# Patient Record
Sex: FEMALE | Race: WHITE | Employment: FULL TIME | URBAN - METROPOLITAN AREA
[De-identification: names, ages, dates, MRNs, and addresses within clinical notes are randomized per-mention and may not be internally consistent; named-entity substitution may affect disease eponyms.]

---

## 2023-10-26 ENCOUNTER — OFFICE VISIT (OUTPATIENT)
Dept: GASTROENTEROLOGY | Facility: CLINIC | Age: 38
End: 2023-10-26

## 2023-10-26 VITALS
SYSTOLIC BLOOD PRESSURE: 110 MMHG | WEIGHT: 172 LBS | BODY MASS INDEX: 29.01 KG/M2 | HEART RATE: 85 BPM | HEIGHT: 64.5 IN | DIASTOLIC BLOOD PRESSURE: 73 MMHG

## 2023-10-26 DIAGNOSIS — R10.11 RUQ PAIN: Primary | ICD-10-CM

## 2023-10-26 DIAGNOSIS — Z78.9 ALCOHOL USE: ICD-10-CM

## 2023-10-26 DIAGNOSIS — K59.09 CHRONIC CONSTIPATION: ICD-10-CM

## 2023-10-26 PROBLEM — F41.8 DEPRESSION WITH ANXIETY: Status: ACTIVE | Noted: 2023-10-26

## 2023-10-26 PROCEDURE — 3074F SYST BP LT 130 MM HG: CPT | Performed by: INTERNAL MEDICINE

## 2023-10-26 PROCEDURE — 3008F BODY MASS INDEX DOCD: CPT | Performed by: INTERNAL MEDICINE

## 2023-10-26 PROCEDURE — 3078F DIAST BP <80 MM HG: CPT | Performed by: INTERNAL MEDICINE

## 2023-10-26 PROCEDURE — 99244 OFF/OP CNSLTJ NEW/EST MOD 40: CPT | Performed by: INTERNAL MEDICINE

## 2023-10-26 RX ORDER — LORATADINE 10 MG/1
CAPSULE, LIQUID FILLED ORAL AS DIRECTED
COMMUNITY

## 2023-10-26 RX ORDER — ESCITALOPRAM OXALATE 20 MG/1
TABLET ORAL
COMMUNITY
Start: 2023-10-22

## 2023-10-26 RX ORDER — NALTREXONE HYDROCHLORIDE 50 MG/1
50 TABLET, FILM COATED ORAL DAILY
COMMUNITY

## 2023-10-26 RX ORDER — FERROUS SULFATE 324(65)MG
TABLET, DELAYED RELEASE (ENTERIC COATED) ORAL AS DIRECTED
COMMUNITY

## 2023-10-26 RX ORDER — ALBUTEROL SULFATE 90 UG/1
2 AEROSOL, METERED RESPIRATORY (INHALATION) EVERY 4 HOURS PRN
COMMUNITY
Start: 2023-06-06

## 2023-10-26 RX ORDER — CLINDAMYCIN PHOSPHATE 11.9 MG/ML
1 SOLUTION TOPICAL 2 TIMES DAILY
COMMUNITY
Start: 2023-09-20

## 2023-10-26 RX ORDER — FLUTICASONE PROPIONATE 50 MCG
SPRAY, SUSPENSION (ML) NASAL AS DIRECTED
COMMUNITY

## 2023-10-26 RX ORDER — CETIRIZINE HYDROCHLORIDE 10 MG/1
10 TABLET ORAL DAILY
COMMUNITY

## 2023-10-26 RX ORDER — KETOCONAZOLE 20 MG/ML
SHAMPOO TOPICAL
COMMUNITY
Start: 2023-09-20

## 2023-10-26 RX ORDER — BENZOYL PEROXIDE 50 MG/ML
LIQUID TOPICAL
COMMUNITY
Start: 2023-09-20

## 2023-10-26 NOTE — PATIENT INSTRUCTIONS
Flush out   Wait 2 days and the start fiber powder consistently  Breath testing for H.pylori  Ultrasound to r/out gallstones and check fibrosis of liver      ----    WASHOUT INSTRUCTIONS:  1. Pick a day you will be at home, close to a toilet. 2. Have a some juice for breakfast.   3. Around 8AM start drinking the solution prescribed (for trilyte, drink 1 cup every 15 minutes) over the course of 3-5 hours. IF having nausea/bloating, take a break for 30 minutes, walk around and then resume drinking. If vomiting, take a break for 1 hour, if symptoms improve, and you feel well, can resume drinking. 4. Goal is to finish solution or until stools turn liquid yellow. 5. For lunch you should have a liquid diet (7up, water, gingerale, etc)  6. After prep, for dinner you can have a light dinner. 7. Resume regular meals the following day, along with laxative medications. 8. You should continue your regular medications during the washout day.

## 2023-10-26 NOTE — H&P
[facility], [facility] - Gastroenterology                                                                                                               Reason for consult: ab pain, dark stools    Requesting physician or provider: Glendy Ocasio    Patient presents with:  Abdominal Pain: Family Hx celiac disease per pt      HPI:   Farooq Dunbar is a 41year old year-old female with history of eczema, allergies,  who presents for abdominal pain and dark stools. -[place]--family hx of celiac disease (mother, aunts, uncles, 6 of 8 brothers/sisters)  -tested in the past multiple times for celiac and was negative  -hx of constipation--->seen at [facility] by Dr. Donna Barnes  -In the past she has a hx of HLA DQ2, but neg for HLA DQ8  -Recently: this week she started gluten free diet   -chronic fatigue, napping all the time   -stomach is always unhappy   -brain fog  -she has lost about 10lbs or more, was on saxenda in the past (stopped in Sep 2023)  -lactose intolerance issues  -no melena  -no hematochezia  -no odynophagia/dysphagia  -no cp/sob  -no GI malig in fam      Labs in June 2023 at [facility]:  -normal CMP  -normal CBC  -neg celiac panel    Prior endoscopies:  2017 EGD-   -neg Hpylori  -mild gastritis    FINAL DIAGNOSIS  A. (Biopsies, duodenum): Unremarkable duodenal mucosa with Brunner's gland   nodules. No short villi, increased intraepithelial lymphocytes, increased   eosinophils or active duodenitis.      Soc:  -no smoking  -on naltrexone for Etoh, drinks 3 wine bottles a week    Wt Readings from Last 6 Encounters:  10/26/23 : 172 lb (78 kg)       History, Medications, Allergies, ROS:      Past Medical History:   Diagnosis Date    Anemia       Past Surgical History:   Procedure Laterality Date    EGD  2016      Family Hx:   Family History   Problem Relation Age of Onset    Other (celiac) Father     Other (celiac) Mother     Other (celiac) Sister     Other (celiac) Brother       Social History:   Social History     Socioeconomic History    Marital status:    Tobacco Use    Smoking status: Never    Smokeless tobacco: Never   Substance and Sexual Activity    Alcohol use: Yes        Medications (Active prior to today's visit):  Current Outpatient Medications   Medication Sig Dispense Refill    albuterol 108 (90 Base) MCG/ACT Inhalation Aero Soln Inhale 2 puffs into the lungs every 4 (four) hours as needed. BENZOYL PEROXIDE [address] 5 % External Liquid DAILY, CLEAN AND WASH FACE AND PAT DRY      cetirizine 10 MG Oral Tab Take 1 tablet (10 mg total) by mouth daily. escitalopram 20 MG Oral Tab       hydrocortisone 2.5 % External Ointment Apply 1 Application topically 2 (two) times daily as needed. APPLY TO AFFECTED AREA      ketoconazole 2 % External Shampoo Apply topically 3 (three) times a week. tretinoin 0.025 % External Cream 1 Ring every 28 days. FOR 21 DAYS IN, THEN REMOVE FOR 7 DAYS      clindamycin 1 % External Solution Apply 1 Application topically 2 (two) times daily. Loratadine 10 MG Oral Cap Take by mouth As Directed. fluticasone propionate (FLONASE ALLERGY RELIEF) 50 MCG/ACT Nasal Suspension by Nasal route As Directed. Ferrous Sulfate 324 (65 Fe) MG Oral Tab EC Take by mouth As Directed. Cyanocobalamin 1000 MCG Oral Cap Take by mouth As Directed. Ascorbic Acid 500 MG Oral Cap Take by mouth As Directed. naltrexone 50 MG Oral Tab Take 1 tablet (50 mg total) by mouth daily.          Allergies:    Etonogestrel-Ethiny*    RASH    ROS:   CONSTITUTIONAL:  negative for fevers, rigors  EYES:  negative for diplopia   RESPIRATORY:  negative for severe shortness of breath  CARDIOVASCULAR:  negative for crushing sub-sternal chest pain  GASTROINTESTINAL:  see HPI  GENITOURINARY:  negative for dysuria or gross hematuria  INTEGUMENT/BREAST:  SKIN:  negative for jaundice   ALLERGIC/IMMUNOLOGIC:  negative for hay fever  ENDOCRINE:  negative for cold intolerance and heat intolerance  MUSCULOSKELETAL:  negative for joint effusion/severe erythema  BEHAVIOR/PSYCH:  negative for psychotic behavior      PHYSICAL EXAM:   Blood pressure 110/73, pulse 85, height 5' 4.5\" (1.638 m), weight 172 lb (78 kg). Gen- Patient appears comfortable and in no acute discomfort  HEENT: the sclera appears anicteric, oropharynx clear, mucus membranes appear moist  CV- regular rate and rhythm, the extremities are warm and well perfused   Lung- Moves air well; No labored breathing  Abdomen- soft, non-tender exam in all quadrants without rigidity or guarding, non-distended, no abnormal bowel sounds noted, no masses are palpated  Skin- No jaundice  Ext: no cyanosis, clubbing or edema is evident. Neuro- Alert and interactive, and gross movements of extremities normal  Psych - appropriate, non-agitated    Labs/Imaging:     Patient's pertinent labs and imaging were reviewed and discussed with patient today. ASSESSMENT/PLAN:   Pau Yang is a 41year old year-old female with history of eczema, allergies,  who presents for abdominal pain and dark stools. #Constipation - washout and then take OTC laxatives    #Family hx of Celiac disease - she gets annual blood work to r/out celiac. #Dyspepsia -check Hpylori status, r.out gallstones. #Hx of alcohol use - 2-3 bottles a wine a week/ we discussed that no amount of alcohol is safe for the liver, however I strongly recommend she reduce intake to 1-2 drinks a week total. Check fibrosis status. Recommendations:    Flush out   Wait 2 days and the start fiber powder consistently  Breath testing for H.pylori  Ultrasound to r/out gallstones and check fibrosis of liver      ----    WASHOUT INSTRUCTIONS:  1. Pick a day you will be at home, close to a toilet.   2. Have a some juice for breakfast.   3. Around 8AM start drinking the solution prescribed (for trilyte, drink 1 cup every 15 minutes) over the course of 3-5 hours. IF having nausea/bloating, take a break for 30 minutes, walk around and then resume drinking. If vomiting, take a break for 1 hour, if symptoms improve, and you feel well, can resume drinking. 4. Goal is to finish solution or until stools turn liquid yellow. 5. For lunch you should have a liquid diet (7up, water, gingerale, etc)  6. After prep, for dinner you can have a light dinner. 7. Resume regular meals the following day, along with laxative medications. 8. You should continue your regular medications during the washout day. Orders This Visit:  Orders Placed This Encounter      Helicobacter Pylori Breath Test      Meds This Visit:  Requested Prescriptions     Signed Prescriptions Disp Refills    polyethylene glycol, PEG 3350-KCl-NaBcb-NaCl-NaSulf, 236 g Oral Recon Soln 4000 mL 0     Sig: Take 4,000 mL by mouth once for 1 dose. As directed by GI clinic instructions. Imaging & Referrals:  US LIVER WITH ELASTOGRAPHY(CPT=76705,31713)         BETHANY Solis MD  Pager: [phone]  10/26/2023        This note was partially prepared using [employer] [place] voice recognition dictation software. As a result, errors may occur. When identified, these errors have been corrected.  While every attempt is made to correct errors during dictation, discrepancies may still exist.

## 2023-11-30 ENCOUNTER — TELEPHONE (OUTPATIENT)
Facility: CLINIC | Age: 38
End: 2023-11-30

## 2023-11-30 NOTE — TELEPHONE ENCOUNTER
1st,overdue reminder letter mailed out to patient   Labs and Imaging order :  Helicobacter Pylori Breath Test  US LIVER WITH ELASTOGRAPHY(CPT=76705,69901)

## (undated) NOTE — LETTER
11/30/2023              [address] [place] [id number]         Dear Zelda Puja records indicate that the tests ordered for you by Sharon Resendiz MD  have not been done. If you have, in fact, already completed the tests or you do not wish to have the tests done, please contact our office at [address] [place] [address]. Otherwise, please proceed with the testing. To schedule a test at any [facility], call [facility] at [phone], Monday through Friday between 7:30am to 6pm and on Saturday between 8am and 1pm.   Evening and weekend appointments for your exam are available. Labs and Imaging order :  Helicobacter Pylori Breath Test - Please go to the lab to complete this test. Make sure you do not take a PPI (omeprazole/pantoprazole) or H2 blocker (famotidine) medication for 2 weeks prior to the test as this could result in a false negative result. Also do not eat one hour prior to the test.    H-pylori breath test     This test requires the adult patient (>16years of age) to fast for 1 hour prior to test administration. The patient should not have taken antibiotics, proton pump inhibitors (e.g., Prilosec, Prevacid, Aciphex, Nexium), or bismuth preparations (e.g., Pepto-Bismol) within the previous 14 days. The effect of histamine 2-receptor antogonists (e.g., Axid, Pepcid, Tagamet, Zantac) may reduce urease activity on urea breath tests and should be discontinued for 24-48 hours before the sample is collected. When used to monitor treatment, the test should be performed four weeks after cessation of definitive therapy. The patient should be informed that the Pranactin-Citric drink that will be administered contains phenylalanine. Phenylketonurics restrict dietary phenylalanine.        US LIVER WITH ELASTOGRAPHY(CPT=76705,80035)      Sincerely,    TEJ Mcdaniels MD  [facility], [facility], [address] [place] [address] [address]  [address] [facility] [id number] [phone]